# Patient Record
Sex: MALE | Race: WHITE | NOT HISPANIC OR LATINO | URBAN - METROPOLITAN AREA
[De-identification: names, ages, dates, MRNs, and addresses within clinical notes are randomized per-mention and may not be internally consistent; named-entity substitution may affect disease eponyms.]

---

## 2018-01-25 ENCOUNTER — OUTPATIENT (OUTPATIENT)
Dept: OUTPATIENT SERVICES | Facility: HOSPITAL | Age: 73
LOS: 1 days | End: 2018-01-25
Payer: MEDICARE

## 2018-01-25 ENCOUNTER — TRANSCRIPTION ENCOUNTER (OUTPATIENT)
Age: 73
End: 2018-01-25

## 2018-01-25 VITALS
RESPIRATION RATE: 18 BRPM | SYSTOLIC BLOOD PRESSURE: 112 MMHG | OXYGEN SATURATION: 90 % | DIASTOLIC BLOOD PRESSURE: 65 MMHG | WEIGHT: 147.27 LBS | TEMPERATURE: 98 F | HEART RATE: 74 BPM | HEIGHT: 67 IN

## 2018-01-25 VITALS
HEART RATE: 88 BPM | DIASTOLIC BLOOD PRESSURE: 55 MMHG | RESPIRATION RATE: 18 BRPM | SYSTOLIC BLOOD PRESSURE: 99 MMHG | OXYGEN SATURATION: 96 %

## 2018-01-25 DIAGNOSIS — Z78.9 OTHER SPECIFIED HEALTH STATUS: Chronic | ICD-10-CM

## 2018-01-25 DIAGNOSIS — H25.811 COMBINED FORMS OF AGE-RELATED CATARACT, RIGHT EYE: ICD-10-CM

## 2018-01-25 PROCEDURE — 66984 XCAPSL CTRC RMVL W/O ECP: CPT | Mod: RT

## 2018-01-25 PROCEDURE — C1780: CPT

## 2018-01-25 NOTE — ASU PATIENT PROFILE, ADULT - HEALTHCARE INFORMATION NEEDED, PROFILE
patient is on continuous Nasal O2 at 14L/min humidified air patient is on continuous Nasal O2 at 14L/min humidified air. Nothing on left arm

## 2018-01-25 NOTE — ASU PATIENT PROFILE, ADULT - PMH
CA - skin cancer    CAD (coronary artery disease)    Cholesterol serum elevated    CVA (cerebral vascular accident)    DVT (deep venous thrombosis)    Former smoker    GERD (gastroesophageal reflux disease)    H/O pulmonary hypertension    H/O recurrent transient ischemic attacks    HTN (hypertension)    Hx of pulmonary embolus  x 3  MI (myocardial infarction)  1986  PAH (pulmonary artery hypertension)

## 2018-01-25 NOTE — ASU PATIENT PROFILE, ADULT - PSH
Central venous catheter in place  [place] left chest  H/O inguinal hernia repair    H/O varicose vein stripping    Hx of CABG  3 V   2012
